# Patient Record
Sex: FEMALE
[De-identification: names, ages, dates, MRNs, and addresses within clinical notes are randomized per-mention and may not be internally consistent; named-entity substitution may affect disease eponyms.]

---

## 2021-04-06 ENCOUNTER — APPOINTMENT (OUTPATIENT)
Dept: AFTER HOURS CARE | Facility: EMERGENCY ROOM | Age: 71
End: 2021-04-06
Payer: MEDICARE

## 2021-04-06 DIAGNOSIS — Z86.79 PERSONAL HISTORY OF OTHER DISEASES OF THE CIRCULATORY SYSTEM: ICD-10-CM

## 2021-04-06 DIAGNOSIS — R04.0 EPISTAXIS: ICD-10-CM

## 2021-04-06 PROCEDURE — 99202 OFFICE O/P NEW SF 15 MIN: CPT | Mod: 95

## 2021-04-07 PROBLEM — R04.0 EPISTAXIS: Status: ACTIVE | Noted: 2021-04-07

## 2021-04-07 PROBLEM — Z00.00 ENCOUNTER FOR PREVENTIVE HEALTH EXAMINATION: Status: ACTIVE | Noted: 2021-04-07

## 2021-04-07 PROBLEM — Z86.79 HISTORY OF ESSENTIAL HYPERTENSION: Status: RESOLVED | Noted: 2021-04-07 | Resolved: 2021-04-07

## 2021-07-29 NOTE — REVIEW OF SYSTEMS
[Nosebleed] : nosebleed [Fever] : no fever [Chills] : no chills [Fatigue] : no fatigue [Pain] : no pain [Chest Pain] : no chest pain [Palpitations] : no palpitations [Shortness Of Breath] : no shortness of breath [Wheezing] : no wheezing [Abdominal Pain] : no abdominal pain [Nausea] : no nausea [Muscle Pain] : no muscle pain [Headache] : no headache [Dizziness] : no dizziness

## 2021-07-29 NOTE — PHYSICAL EXAM
[No Acute Distress] : no acute distress [Well Developed] : well developed [EOMI] : extraocular movements intact [Supple] : supple [No Respiratory Distress] : no respiratory distress  [Speech Grossly Normal] : speech grossly normal [de-identified] : epistaxis left nose  [de-identified] : aleert and orinted x 4 ,

## 2021-07-29 NOTE — HISTORY OF PRESENT ILLNESS
[Home] : at home, [unfilled] , at the time of the visit. [Verbal consent obtained from patient] : the patient, [unfilled] [Spouse] : spouse [Other Location: e.g. Home (Enter Location, City,State)___] : at [unfilled] [FreeTextEntry8] : 69 yo F with hx of HTN controlled with Amlodipine presenting for an evlaution for left nostril epistaxis atrauamtic and not on any anticoagulation. Denies f/c ha, dzziness, cp, sob, abd pain , n/v d or dysuria. states that she felt her nose was dry today and thend developed the bleeding tonight states that she was applying pressure but the epistaxis did not improve. \par Has not used any medications to improve the bleeding